# Patient Record
Sex: FEMALE | Race: OTHER | HISPANIC OR LATINO | URBAN - METROPOLITAN AREA
[De-identification: names, ages, dates, MRNs, and addresses within clinical notes are randomized per-mention and may not be internally consistent; named-entity substitution may affect disease eponyms.]

---

## 2021-02-24 ENCOUNTER — EMERGENCY (EMERGENCY)
Facility: HOSPITAL | Age: 36
LOS: 1 days | Discharge: HOME | End: 2021-02-24
Attending: EMERGENCY MEDICINE | Admitting: EMERGENCY MEDICINE
Payer: MEDICAID

## 2021-02-24 VITALS
HEART RATE: 140 BPM | SYSTOLIC BLOOD PRESSURE: 167 MMHG | TEMPERATURE: 98 F | OXYGEN SATURATION: 98 % | DIASTOLIC BLOOD PRESSURE: 77 MMHG | RESPIRATION RATE: 20 BRPM

## 2021-02-24 LAB
ALBUMIN SERPL ELPH-MCNC: 4.8 G/DL — SIGNIFICANT CHANGE UP (ref 3.3–5)
ALP SERPL-CCNC: 113 U/L — SIGNIFICANT CHANGE UP (ref 40–120)
ALT FLD-CCNC: 26 U/L — SIGNIFICANT CHANGE UP (ref 4–33)
ANION GAP SERPL CALC-SCNC: 15 MMOL/L — HIGH (ref 7–14)
APPEARANCE UR: CLEAR — SIGNIFICANT CHANGE UP
APTT BLD: 33.5 SEC — SIGNIFICANT CHANGE UP (ref 27–36.3)
AST SERPL-CCNC: 18 U/L — SIGNIFICANT CHANGE UP (ref 4–32)
BASOPHILS # BLD AUTO: 0.1 K/UL — SIGNIFICANT CHANGE UP (ref 0–0.2)
BASOPHILS NFR BLD AUTO: 0.7 % — SIGNIFICANT CHANGE UP (ref 0–2)
BILIRUB SERPL-MCNC: 0.5 MG/DL — SIGNIFICANT CHANGE UP (ref 0.2–1.2)
BILIRUB UR-MCNC: NEGATIVE — SIGNIFICANT CHANGE UP
BUN SERPL-MCNC: 11 MG/DL — SIGNIFICANT CHANGE UP (ref 7–23)
CALCIUM SERPL-MCNC: 9.6 MG/DL — SIGNIFICANT CHANGE UP (ref 8.4–10.5)
CHLORIDE SERPL-SCNC: 101 MMOL/L — SIGNIFICANT CHANGE UP (ref 98–107)
CO2 SERPL-SCNC: 22 MMOL/L — SIGNIFICANT CHANGE UP (ref 22–31)
COLOR SPEC: SIGNIFICANT CHANGE UP
CREAT SERPL-MCNC: 0.71 MG/DL — SIGNIFICANT CHANGE UP (ref 0.5–1.3)
DIFF PNL FLD: NEGATIVE — SIGNIFICANT CHANGE UP
EOSINOPHIL # BLD AUTO: 0.07 K/UL — SIGNIFICANT CHANGE UP (ref 0–0.5)
EOSINOPHIL NFR BLD AUTO: 0.5 % — SIGNIFICANT CHANGE UP (ref 0–6)
GLUCOSE SERPL-MCNC: 118 MG/DL — HIGH (ref 70–99)
GLUCOSE UR QL: NEGATIVE — SIGNIFICANT CHANGE UP
HCT VFR BLD CALC: 41.6 % — SIGNIFICANT CHANGE UP (ref 34.5–45)
HGB BLD-MCNC: 13 G/DL — SIGNIFICANT CHANGE UP (ref 11.5–15.5)
IANC: 7.6 K/UL — SIGNIFICANT CHANGE UP (ref 1.5–8.5)
IMM GRANULOCYTES NFR BLD AUTO: 0.7 % — SIGNIFICANT CHANGE UP (ref 0–1.5)
INR BLD: 1.13 RATIO — SIGNIFICANT CHANGE UP (ref 0.88–1.16)
KETONES UR-MCNC: NEGATIVE — SIGNIFICANT CHANGE UP
LEUKOCYTE ESTERASE UR-ACNC: NEGATIVE — SIGNIFICANT CHANGE UP
LYMPHOCYTES # BLD AUTO: 35.1 % — SIGNIFICANT CHANGE UP (ref 13–44)
LYMPHOCYTES # BLD AUTO: 4.69 K/UL — HIGH (ref 1–3.3)
MCHC RBC-ENTMCNC: 26.5 PG — LOW (ref 27–34)
MCHC RBC-ENTMCNC: 31.3 GM/DL — LOW (ref 32–36)
MCV RBC AUTO: 84.7 FL — SIGNIFICANT CHANGE UP (ref 80–100)
MONOCYTES # BLD AUTO: 0.8 K/UL — SIGNIFICANT CHANGE UP (ref 0–0.9)
MONOCYTES NFR BLD AUTO: 6 % — SIGNIFICANT CHANGE UP (ref 2–14)
NEUTROPHILS # BLD AUTO: 7.6 K/UL — HIGH (ref 1.8–7.4)
NEUTROPHILS NFR BLD AUTO: 57 % — SIGNIFICANT CHANGE UP (ref 43–77)
NITRITE UR-MCNC: NEGATIVE — SIGNIFICANT CHANGE UP
NRBC # BLD: 0 /100 WBCS — SIGNIFICANT CHANGE UP
NRBC # FLD: 0 K/UL — SIGNIFICANT CHANGE UP
PCP SPEC-MCNC: SIGNIFICANT CHANGE UP
PCP SPEC-MCNC: SIGNIFICANT CHANGE UP
PH UR: 6.5 — SIGNIFICANT CHANGE UP (ref 5–8)
PLATELET # BLD AUTO: 397 K/UL — SIGNIFICANT CHANGE UP (ref 150–400)
POTASSIUM SERPL-MCNC: 3.2 MMOL/L — LOW (ref 3.5–5.3)
POTASSIUM SERPL-SCNC: 3.2 MMOL/L — LOW (ref 3.5–5.3)
PROT SERPL-MCNC: 8.3 G/DL — SIGNIFICANT CHANGE UP (ref 6–8.3)
PROT UR-MCNC: ABNORMAL
PROTHROM AB SERPL-ACNC: 12.9 SEC — SIGNIFICANT CHANGE UP (ref 10.6–13.6)
RBC # BLD: 4.91 M/UL — SIGNIFICANT CHANGE UP (ref 3.8–5.2)
RBC # FLD: 13.4 % — SIGNIFICANT CHANGE UP (ref 10.3–14.5)
SARS-COV-2 RNA SPEC QL NAA+PROBE: SIGNIFICANT CHANGE UP
SODIUM SERPL-SCNC: 138 MMOL/L — SIGNIFICANT CHANGE UP (ref 135–145)
SP GR SPEC: 1.02 — SIGNIFICANT CHANGE UP (ref 1.01–1.02)
TROPONIN T, HIGH SENSITIVITY RESULT: <6 NG/L — SIGNIFICANT CHANGE UP
UROBILINOGEN FLD QL: SIGNIFICANT CHANGE UP
WBC # BLD: 13.36 K/UL — HIGH (ref 3.8–10.5)
WBC # FLD AUTO: 13.36 K/UL — HIGH (ref 3.8–10.5)

## 2021-02-24 PROCEDURE — 99284 EMERGENCY DEPT VISIT MOD MDM: CPT

## 2021-02-24 PROCEDURE — 93306 TTE W/DOPPLER COMPLETE: CPT | Mod: 26

## 2021-02-24 PROCEDURE — 99220: CPT

## 2021-02-24 PROCEDURE — 71275 CT ANGIOGRAPHY CHEST: CPT | Mod: 26

## 2021-02-24 RX ORDER — ATORVASTATIN CALCIUM 80 MG/1
20 TABLET, FILM COATED ORAL AT BEDTIME
Refills: 0 | Status: DISCONTINUED | OUTPATIENT
Start: 2021-02-24 | End: 2021-02-27

## 2021-02-24 RX ORDER — METOPROLOL TARTRATE 50 MG
12.5 TABLET ORAL
Refills: 0 | Status: DISCONTINUED | OUTPATIENT
Start: 2021-02-24 | End: 2021-02-27

## 2021-02-24 RX ORDER — SODIUM CHLORIDE 9 MG/ML
1000 INJECTION INTRAMUSCULAR; INTRAVENOUS; SUBCUTANEOUS ONCE
Refills: 0 | Status: COMPLETED | OUTPATIENT
Start: 2021-02-24 | End: 2021-02-24

## 2021-02-24 RX ORDER — LISINOPRIL 2.5 MG/1
5 TABLET ORAL DAILY
Refills: 0 | Status: DISCONTINUED | OUTPATIENT
Start: 2021-02-24 | End: 2021-02-27

## 2021-02-24 RX ORDER — LISINOPRIL 2.5 MG/1
5 TABLET ORAL ONCE
Refills: 0 | Status: COMPLETED | OUTPATIENT
Start: 2021-02-24 | End: 2021-02-24

## 2021-02-24 RX ORDER — SODIUM CHLORIDE 9 MG/ML
1000 INJECTION INTRAMUSCULAR; INTRAVENOUS; SUBCUTANEOUS
Refills: 0 | Status: DISCONTINUED | OUTPATIENT
Start: 2021-02-24 | End: 2021-02-27

## 2021-02-24 RX ORDER — POTASSIUM CHLORIDE 20 MEQ
20 PACKET (EA) ORAL ONCE
Refills: 0 | Status: COMPLETED | OUTPATIENT
Start: 2021-02-24 | End: 2021-02-24

## 2021-02-24 RX ORDER — ASPIRIN/CALCIUM CARB/MAGNESIUM 324 MG
162 TABLET ORAL ONCE
Refills: 0 | Status: COMPLETED | OUTPATIENT
Start: 2021-02-24 | End: 2021-02-24

## 2021-02-24 RX ADMIN — Medication 12.5 MILLIGRAM(S): at 19:40

## 2021-02-24 RX ADMIN — SODIUM CHLORIDE 1000 MILLILITER(S): 9 INJECTION INTRAMUSCULAR; INTRAVENOUS; SUBCUTANEOUS at 08:02

## 2021-02-24 RX ADMIN — SODIUM CHLORIDE 1000 MILLILITER(S): 9 INJECTION INTRAMUSCULAR; INTRAVENOUS; SUBCUTANEOUS at 09:05

## 2021-02-24 RX ADMIN — LISINOPRIL 5 MILLIGRAM(S): 2.5 TABLET ORAL at 13:24

## 2021-02-24 RX ADMIN — ATORVASTATIN CALCIUM 20 MILLIGRAM(S): 80 TABLET, FILM COATED ORAL at 22:27

## 2021-02-24 RX ADMIN — SODIUM CHLORIDE 150 MILLILITER(S): 9 INJECTION INTRAMUSCULAR; INTRAVENOUS; SUBCUTANEOUS at 16:40

## 2021-02-24 RX ADMIN — Medication 20 MILLIEQUIVALENT(S): at 15:14

## 2021-02-24 RX ADMIN — Medication 162 MILLIGRAM(S): at 08:02

## 2021-02-24 RX ADMIN — SODIUM CHLORIDE 1000 MILLILITER(S): 9 INJECTION INTRAMUSCULAR; INTRAVENOUS; SUBCUTANEOUS at 13:07

## 2021-02-24 NOTE — ED CDU PROVIDER INITIAL DAY NOTE - OBJECTIVE STATEMENT
34 y/o female with a PMH of HLD atorvastatin and recently diagnosed HTN (2/18) on lisinopril compliant with meds presents to the ED complaining of palpitations x 6 days. Patient woke up yesterday and felt her heart was racing so her partner took her pulse and it was 131. Patient was feeling SOB and OLEARY with a non-radiating intermittent burning/stabbing sensation on the left side of her chest. Patient states the palpitations have not been letting her sleep and she has been unable to go to work. The SOB and OLEARY has improved today (patient was able to ambulate to the room without difficulty), but the chest pain/burning has become constant. Patient states she woke up last Thursday feeling dizzy, took her BP noticed it was elevated and went to urgent care. Patient states she was started on lisinopril and d/c. Patient endorses some chills and diarrhea since last Thursday, along with SOB and OLEARY that has improved. Patient denies fever, sick contact, domestic/foreign travel, (COVID neg. 2/23), dizziness, leg swelling, headaches, nausea, vomiting, diaphoresis, back pain, or history of anxiety. Pt states that she has been eating and drinking without difficulties, excessive caffeine intake.  Denies having any recent drug use however does smoke marijuana occasionally.    CDU ALYCIA Henry: Agree with above except as noted. Patient is a 34 y/o F here w/ palpitations x 6 days. States she had a migraine last week, took Excedrin, and checked her BP which was elevated. Was seen at an urgent care and started on HCTZ. Noted palpitations and OLEARY after starting HCTZ. Went back to urgent care 2 days later and was switched to Lisinopril 5mg with improvement of BP, however palpitations have continued. Max HR 140S. Denies excess alcohol or caffeine use. Occasional marijuana use. Feels anxious, but does not believe anxiety is causing her sx. In CDU for tele monitoring, EP eval, and echocardiogram.

## 2021-02-24 NOTE — CONSULT NOTE ADULT - ATTENDING COMMENTS
35yoF w/ PMHx HLD presents with dizziness and palpitations.  In ED, EKG revealed sinus tachycardia 's with improvement with IVF.  States she is dizzy when changing positions from sitting to standing but after a couple minutes feels better.  Orthostatics done (lying) 140/81  (sitting) 140/76  and (standing) 152/106 .  Lab work significant for leukocytosis WBC 13.36.  Patient denies any fever or chills or history of exposure to COVID.      #Sinus tachycardia   Monitor on telemetry  TTE to evaluate LV/RV function  F/u TSH, and thyroid panel  F/u Tox screen   F/u Hcg level   F/u 24 hour urine metanephrine and catecholamine  Start metoprolol tartrate 12.5mg PO BID

## 2021-02-24 NOTE — ED CDU PROVIDER INITIAL DAY NOTE - NS ED ROS FT
ROS:  GENERAL: No fever, no chills  EYES: no change in vision  HEENT: no trouble swallowing, no trouble speaking  CARDIAC: +chest pain, +palpitations  PULMONARY: no cough, +OLEARY  GI: no abdominal pain, no nausea, no vomiting  : No dysuria, no frequency, no change in appearance, or odor of urine  SKIN: no rashes  NEURO: no headache, no weakness  MSK: No joint pain

## 2021-02-24 NOTE — ED PROVIDER NOTE - OBJECTIVE STATEMENT
36 y/o female with a PMH of HLD and recently diagnosed HTN (2/18) presents to the ED complaining of palpitations x 6 days. Patient woke up yesterday and felt her heart was racing so her partner took her pulse and it was 131. Patient was feeling SOB and OLEARY with a non-radiating intermittent burning/stabbing sensation on the left side of her chest. Patient states the palpitations have not been letting her sleep and she has been unable to go to work. The SOB and OLEARY has improved today (patient was able to ambulate to the room without difficulty), but the chest pain/burning has become constant. Patient states she woke up last Thursday feeling dizzy, took her BP noticed it was elevated and went to urgent care. Patient states she was started on lisinopril and d/c. Patient endorses some chills and diarrhea since last Thursday, along with SOB and OLEARY that has improved. Patient denies fever, sick contact, domestic/foreign travel, (COVID neg. 2/23), dizziness, headaches, nausea, vomiting, diaphoresis, back pain, or history of anxiety. 36 y/o female with a PMH of HLD atorvastatin and recently diagnosed HTN (2/18) on lisinopril compliant with meds presents to the ED complaining of palpitations x 6 days. Patient woke up yesterday and felt her heart was racing so her partner took her pulse and it was 131. Patient was feeling SOB and OLEARY with a non-radiating intermittent burning/stabbing sensation on the left side of her chest. Patient states the palpitations have not been letting her sleep and she has been unable to go to work. The SOB and OLEARY has improved today (patient was able to ambulate to the room without difficulty), but the chest pain/burning has become constant. Patient states she woke up last Thursday feeling dizzy, took her BP noticed it was elevated and went to urgent care. Patient states she was started on lisinopril and d/c. Patient endorses some chills and diarrhea since last Thursday, along with SOB and OLEARY that has improved. Patient denies fever, sick contact, domestic/foreign travel, (COVID neg. 2/23), dizziness, leg swelling, headaches, nausea, vomiting, diaphoresis, back pain, or history of anxiety. Pt states that she has been eating and drinking without difficulties, excessive caffeine intake.  Denies having any recent drug use however does smoke marijuana occasionally. 34 y/o female with a PMH of HLD atorvastatin and recently diagnosed HTN () on lisinopril compliant with meds presents to the ED complaining of palpitations x 6 days. Patient woke up yesterday and felt her heart was racing so her partner took her pulse and it was 131. Patient was feeling SOB and OLEARY with a non-radiating intermittent burning/stabbing sensation on the left side of her chest. Patient states the palpitations have not been letting her sleep and she has been unable to go to work. The SOB and OLEARY has improved today (patient was able to ambulate to the room without difficulty), but the chest pain/burning has become constant. Patient states she woke up last Thursday feeling dizzy, took her BP noticed it was elevated and went to urgent care. Patient states she was started on lisinopril and d/c. Patient endorses some chills and diarrhea since last Thursday, along with SOB and OLEARY that has improved. Patient denies fever, sick contact, domestic/foreign travel, (COVID neg. ), dizziness, leg swelling, headaches, nausea, vomiting, diaphoresis, back pain, or history of anxiety. Pt states that she has been eating and drinking without difficulties, excessive caffeine intake.  Denies having any recent drug use however does smoke marijuana occasionally.    Attendinyo female presents palpitations and shortness of breath.  had chest pain earlier intermittently.  no exertional symptoms.   does not feel short of breath now.

## 2021-02-24 NOTE — ED ADULT TRIAGE NOTE - CHIEF COMPLAINT QUOTE
pt arrives c/o palpitations since last night, 's in triage, was at [facility] care on Thursday for elevated BP, EKG in progress

## 2021-02-24 NOTE — ED ADULT NURSE REASSESSMENT NOTE - NS ED NURSE REASSESS COMMENT FT1
report received from night RN. pt a&ox4, ambulatory. respirations even and unlabored on room air. sinus tachycardia noted on CCM with rate of 115. pt reports improvement in palpitations. Left 20G IV noted with bolus infusing, no complications noted. pt denies pain, chest pain, SOB, N/V/D. Urine and blood work collected and sent. EKG in chart. COVID swab completed.

## 2021-02-24 NOTE — ED CDU PROVIDER INITIAL DAY NOTE - ATTENDING CONTRIBUTION TO CARE
Dr. Weir:  I performed a face to face bedside interview with patient regarding history of present illness, review of symptoms and past medical history. I completed an independent physical exam.  I have discussed patient's plan of care with PA.   I agree with note as stated above, having amended the EMR as needed to reflect my findings.   This includes HISTORY OF PRESENT ILLNESS, HIV, PAST MEDICAL/SURGICAL/FAMILY/SOCIAL HISTORY, ALLERGIES AND HOME MEDICATIONS, REVIEW OF SYSTEMS, PHYSICAL EXAM, and any PROGRESS NOTES during the time I functioned as the attending physician for this patient.    34yo female presents palpitations and shortness of breath.  +intermittent CP.  Tachycardic on monitor    Exam:  - nad  - tachy, regular   -ctab   -abd soft ntnd    A/P  - palpitations/tachycardia  - appreciate EP recs  - pending echo

## 2021-02-24 NOTE — ED PROVIDER NOTE - CLINICAL SUMMARY MEDICAL DECISION MAKING FREE TEXT BOX
36 y/o female with a PMH of HLD atorvastatin and recently diagnosed HTN (2/18) on lisinopril compliant with meds presents to the ED complaining of palpitations x 6 days. Palpitations- possible PE, infection, arrythmia will do labs, ekg, trop ct chest, echo will continue to monitor.

## 2021-02-24 NOTE — ED ADULT NURSE NOTE - OBJECTIVE STATEMENT
A&Ox4, amb c/c SOB, L sided chest pain, palpitations, dizziness since Thursday - no surgical hx, NDKA, no recent sickness, HX hyperlipidemia, occasional marijuana use. IV placed L arm 20G - placed on cardiac monitoring. Attending at bedside. Pt is a 35yr old female, A&Ox4 and ambulatory, PMH of HDL,  c/o of midsternal burning chest pain, palpitations, dizziness since Thursday. Pt went to urgent care a day ago and had high blood pressure and was started on Lisinopril. Resp. even and unlabored. Denies SOB, HA, dizziness, abd. pain, N/V, fever/chills, cough, and urinary symptoms. IV placed L arm 20G. Labs sent. Sinus tachycardia on the CM. VS as noted. MD Roberson at bedside. NAD.

## 2021-02-24 NOTE — CONSULT NOTE ADULT - ASSESSMENT
35yoF w/ PMHx HLD presents with dizziness and palpitations.  In ED, EKG revealed sinus tachycardia 's with improvement with IVF.  States she is dizzy when changing positions from sitting to standing but after a couple minutes feels better.  Orthostatics done (laying) 140/81  (sitting) 140/76  and (standing) 152/106 .  Lab work significant for leukocytosis WBC 13.36.  Patient denies any fever or chills or history of exposure to COVID.      #Sinus tachycardia   Monitor on telemetry  TTE to evaluate LV/RV function  F/u TSH, and thyroid panel  F/u Tox screen   F/u Hcg level  35yoF w/ PMHx HLD presents with dizziness and palpitations.  In ED, EKG revealed sinus tachycardia 's with improvement with IVF.  States she is dizzy when changing positions from sitting to standing but after a couple minutes feels better.  Orthostatics done (laying) 140/81  (sitting) 140/76  and (standing) 152/106 .  Lab work significant for leukocytosis WBC 13.36.  Patient denies any fever or chills or history of exposure to COVID.      #Sinus tachycardia   Monitor on telemetry  TTE to evaluate LV/RV function  F/u TSH, and thyroid panel  F/u Tox screen   F/u Hcg level   F/u 24 hour urine metanephrine and catecholeamine 35yoF w/ PMHx HLD presents with dizziness and palpitations.  In ED, EKG revealed sinus tachycardia 's with improvement with IVF.  States she is dizzy when changing positions from sitting to standing but after a couple minutes feels better.  Orthostatics done (laying) 140/81  (sitting) 140/76  and (standing) 152/106 .  Lab work significant for leukocytosis WBC 13.36.  Patient denies any fever or chills or history of exposure to COVID.      #Sinus tachycardia   Monitor on telemetry  TTE to evaluate LV/RV function  F/u TSH, and thyroid panel  F/u Tox screen   F/u Hcg level   F/u 24 hour urine metanephrine and catecholeamine  Start metoprolol tartrate 12.5mg PO BID

## 2021-02-24 NOTE — ED CDU PROVIDER INITIAL DAY NOTE - PHYSICAL EXAMINATION
Vital signs reviewed.   CONSTITUTIONAL: Well-appearing; well-nourished; in no apparent distress. Non-toxic appearing.   HEAD: Normocephalic, atraumatic.  EYES: PERRL, EOM intact, conjunctiva and sclera WNL.  ENT: normal nose; no rhinorrhea; normal pharynx with no tonsillar hypertrophy, no erythema, no exudate, no lymphadenopathy.  NECK/LYMPH: Supple; non-tender; no cervical lymphadenopathy.  CARD: Tachycardic, S1, S2; no LE edema or calf tenderness vicente   RESP: Normal chest excursion with respiration; breath sounds clear and equal bilaterally; no wheezes, rhonchi, or rales.  EXT/MS: moves all extremities; distal pulses are normal, no pedal edema.  SKIN: Normal for age and race; warm; dry; good turgor; no apparent lesions or exudate noted.  NEURO: Awake, alert, oriented x 3, no gross deficits, CN II-XII grossly intact, no motor or sensory deficit noted.  PSYCH: Normal mood; appropriate affect.

## 2021-02-24 NOTE — CONSULT NOTE ADULT - SUBJECTIVE AND OBJECTIVE BOX
Date of Admission: 2/24/2021    CHIEF COMPLAINT: Palpitations    HISTORY OF PRESENT ILLNESS:  This is a 35yoF w/ PMHx HLD presents with dizziness and palpitations.  Symptoms started about 2 days ago for which she went to an urgent care center and was found to have high blood pressure (SBP>170) and tachycardic (HR>130) and was started on HCTZ which made her too weak to ambulate.  Denies any changes in lifestyle, diet or medications other than atorvastatin, HCTZ and lisinopril that was she was started on.  Had a dentist appointment 2 weeks ago at which time her vitals were WNL.      In ED, EKG revealed sinus tachycardia 's with improvement with IVF.  States she is dizzy when changing positions from sitting to standing but after a couple minutes feels better.  Orthostatics done (laying) 140/81  (sitting) 140/76  and (standing) 152/106 .  Lab work significant for leukocytosis WBC 13.36.  Patient denies any fever or chills or history of exposure to COVID.      Allergies  No Known Allergies    MEDICATIONS:  lisinopril 5 milliGRAM(s) Oral once    REVIEW OF SYSTEMS:  See HPI. Otherwise, 10 point ROS done and otherwise negative.    PHYSICAL EXAM:  T(C): 36.9 (02-24-21 @ 12:56), Max: 36.9 (02-24-21 @ 12:56)  HR: 117 (02-24-21 @ 12:56) (117 - 140)  BP: 140/81 (02-24-21 @ 12:56) (140/81 - 167/77)  RR: 18 (02-24-21 @ 12:56) (18 - 20)  SpO2: 100% (02-24-21 @ 12:56) (98% - 100%)  Wt(kg): --  I&O's Summary    Appearance: Normal	  HEENT:   Normal oral mucosa, PERRL, EOMI	  Lymphatic: No lymphadenopathy  Cardiovascular: Normal S1 S2, No JVD, No murmurs, No edema  Respiratory: Lungs clear to auscultation	  Psychiatry: A & O x 3, Mood & affect appropriate  Gastrointestinal:  Soft, Non-tender, + BS	  Skin: No rashes, No ecchymoses, No cyanosis	  Neurologic: Non-focal  Extremities: Normal range of motion, No clubbing, cyanosis or edema  Vascular: Peripheral pulses palpable 2+ bilaterally    LABS:	 	  CBC Full  -  ( 24 Feb 2021 08:50 )  WBC Count : 13.36 K/uL  Hemoglobin : 13.0 g/dL  Hematocrit : 41.6 %  Platelet Count - Automated : 397 K/uL  Mean Cell Volume : 84.7 fL  Mean Cell Hemoglobin : 26.5 pg  Mean Cell Hemoglobin Concentration : 31.3 gm/dL  Auto Neutrophil # : 7.60 K/uL  Auto Lymphocyte # : 4.69 K/uL  Auto Monocyte # : 0.80 K/uL  Auto Eosinophil # : 0.07 K/uL  Auto Basophil # : 0.10 K/uL  Auto Neutrophil % : 57.0 %  Auto Lymphocyte % : 35.1 %  Auto Monocyte % : 6.0 %  Auto Eosinophil % : 0.5 %  Auto Basophil % : 0.7 %    02-24    138  |  101  |  11  ----------------------------<  118<H>  3.2<L>   |  22  |  0.71    Ca    9.6      24 Feb 2021 08:50    TPro  8.3  /  Alb  4.8  /  TBili  0.5  /  DBili  x   /  AST  18  /  ALT  26  /  AlkPhos  113  02-24      < from: CT Angio Chest w/ IV Cont (02.24.21 @ 12:19) >  IMPRESSION:    No pulmonary embolus.      < end of copied text >

## 2021-02-24 NOTE — ED CDU PROVIDER INITIAL DAY NOTE - PROGRESS NOTE DETAILS
Echo negative. Spoke w/ EP, do not recommend starting any medications now. Request we contact them if patient becomes symptomatic or tachycardia ~140s is sustained. EP ok with HR being low 100s-119 at rest. Pt asymptomatic now. EP called, recommend Metoprolol 12.5mg BID.

## 2021-02-24 NOTE — ED ADULT NURSE NOTE - CHIEF COMPLAINT QUOTE
pt arrives c/o palpitations since last night, 's in triage, was at [facility] care on Thursday for elevated BP, EKG in progress

## 2021-02-25 VITALS
RESPIRATION RATE: 16 BRPM | SYSTOLIC BLOOD PRESSURE: 113 MMHG | HEART RATE: 97 BPM | OXYGEN SATURATION: 100 % | TEMPERATURE: 99 F | DIASTOLIC BLOOD PRESSURE: 67 MMHG

## 2021-02-25 PROBLEM — E78.5 HYPERLIPIDEMIA, UNSPECIFIED: Chronic | Status: ACTIVE | Noted: 2021-02-24

## 2021-02-25 PROBLEM — I10 ESSENTIAL (PRIMARY) HYPERTENSION: Chronic | Status: ACTIVE | Noted: 2021-02-24

## 2021-02-25 LAB
A1C WITH ESTIMATED AVERAGE GLUCOSE RESULT: 5.3 % — SIGNIFICANT CHANGE UP (ref 4–5.6)
ANION GAP SERPL CALC-SCNC: 6 MMOL/L — LOW (ref 7–14)
BASOPHILS # BLD AUTO: 0.06 K/UL — SIGNIFICANT CHANGE UP (ref 0–0.2)
BASOPHILS NFR BLD AUTO: 0.6 % — SIGNIFICANT CHANGE UP (ref 0–2)
BUN SERPL-MCNC: 7 MG/DL — SIGNIFICANT CHANGE UP (ref 7–23)
CALCIUM SERPL-MCNC: 8.4 MG/DL — SIGNIFICANT CHANGE UP (ref 8.4–10.5)
CHLORIDE SERPL-SCNC: 108 MMOL/L — HIGH (ref 98–107)
CO2 SERPL-SCNC: 21 MMOL/L — LOW (ref 22–31)
CREAT SERPL-MCNC: 0.59 MG/DL — SIGNIFICANT CHANGE UP (ref 0.5–1.3)
EOSINOPHIL # BLD AUTO: 0.08 K/UL — SIGNIFICANT CHANGE UP (ref 0–0.5)
EOSINOPHIL NFR BLD AUTO: 0.8 % — SIGNIFICANT CHANGE UP (ref 0–6)
ESTIMATED AVERAGE GLUCOSE: 105 MG/DL — SIGNIFICANT CHANGE UP (ref 68–114)
GLUCOSE SERPL-MCNC: 90 MG/DL — SIGNIFICANT CHANGE UP (ref 70–99)
HCT VFR BLD CALC: 34.5 % — SIGNIFICANT CHANGE UP (ref 34.5–45)
HGB BLD-MCNC: 11 G/DL — LOW (ref 11.5–15.5)
IANC: 6.5 K/UL — SIGNIFICANT CHANGE UP (ref 1.5–8.5)
IMM GRANULOCYTES NFR BLD AUTO: 0.8 % — SIGNIFICANT CHANGE UP (ref 0–1.5)
LYMPHOCYTES # BLD AUTO: 3.31 K/UL — HIGH (ref 1–3.3)
LYMPHOCYTES # BLD AUTO: 31.3 % — SIGNIFICANT CHANGE UP (ref 13–44)
MCHC RBC-ENTMCNC: 27.4 PG — SIGNIFICANT CHANGE UP (ref 27–34)
MCHC RBC-ENTMCNC: 31.9 GM/DL — LOW (ref 32–36)
MCV RBC AUTO: 85.8 FL — SIGNIFICANT CHANGE UP (ref 80–100)
MONOCYTES # BLD AUTO: 0.55 K/UL — SIGNIFICANT CHANGE UP (ref 0–0.9)
MONOCYTES NFR BLD AUTO: 5.2 % — SIGNIFICANT CHANGE UP (ref 2–14)
NEUTROPHILS # BLD AUTO: 6.5 K/UL — SIGNIFICANT CHANGE UP (ref 1.8–7.4)
NEUTROPHILS NFR BLD AUTO: 61.3 % — SIGNIFICANT CHANGE UP (ref 43–77)
NRBC # BLD: 0 /100 WBCS — SIGNIFICANT CHANGE UP
NRBC # FLD: 0 K/UL — SIGNIFICANT CHANGE UP
PLATELET # BLD AUTO: 316 K/UL — SIGNIFICANT CHANGE UP (ref 150–400)
POTASSIUM SERPL-MCNC: 4 MMOL/L — SIGNIFICANT CHANGE UP (ref 3.5–5.3)
POTASSIUM SERPL-SCNC: 4 MMOL/L — SIGNIFICANT CHANGE UP (ref 3.5–5.3)
RBC # BLD: 4.02 M/UL — SIGNIFICANT CHANGE UP (ref 3.8–5.2)
RBC # FLD: 13.8 % — SIGNIFICANT CHANGE UP (ref 10.3–14.5)
SODIUM SERPL-SCNC: 135 MMOL/L — SIGNIFICANT CHANGE UP (ref 135–145)
WBC # BLD: 10.58 K/UL — HIGH (ref 3.8–10.5)
WBC # FLD AUTO: 10.58 K/UL — HIGH (ref 3.8–10.5)

## 2021-02-25 PROCEDURE — 99217: CPT

## 2021-02-25 PROCEDURE — 99214 OFFICE O/P EST MOD 30 MIN: CPT

## 2021-02-25 RX ORDER — ASPIRIN/CALCIUM CARB/MAGNESIUM 324 MG
81 TABLET ORAL DAILY
Refills: 0 | Status: DISCONTINUED | OUTPATIENT
Start: 2021-02-25 | End: 2021-02-27

## 2021-02-25 RX ORDER — METOPROLOL TARTRATE 50 MG
1 TABLET ORAL
Qty: 30 | Refills: 1
Start: 2021-02-25

## 2021-02-25 RX ORDER — LISINOPRIL 2.5 MG/1
0 TABLET ORAL
Qty: 0 | Refills: 0 | DISCHARGE

## 2021-02-25 RX ADMIN — SODIUM CHLORIDE 150 MILLILITER(S): 9 INJECTION INTRAMUSCULAR; INTRAVENOUS; SUBCUTANEOUS at 06:47

## 2021-02-25 RX ADMIN — Medication 12.5 MILLIGRAM(S): at 06:49

## 2021-02-25 RX ADMIN — LISINOPRIL 5 MILLIGRAM(S): 2.5 TABLET ORAL at 06:50

## 2021-02-25 NOTE — ED CDU PROVIDER SUBSEQUENT DAY NOTE - ATTENDING CONTRIBUTION TO CARE
Pt was seen and evaluated by me. Pt is a 34 y/o female with a PMH of HLD and HTN who presented to the ED for palpitations X 6 days prior to presentation. Pt states she woke the day prior to presentation with palpitations and noted to have a HR of 131. Pt states having SOB at that time. Pt was noted to have elevated BP with some dizziness and recently and started on Lisinopril. Pt admits to some burning on her left side of chest. Pt admit to chills with diarrhea. Pt denies any nausea, vomiting, or abd pain. Pt was sent to [facility] for tele monitoring and echo.   Concern for arrhythmia/ACS  Tele monitoring, echo Pt was seen and evaluated by me. Pt is a 36 y/o female with a PMH of HLD and HTN who presented to the ED for palpitations X 6 days prior to presentation. Pt states she woke the day prior to presentation with palpitations and noted to have a HR of 131. Pt states having SOB at that time. Pt was noted to have elevated BP with some dizziness and recently and started on Lisinopril. Pt admits to some burning on her left side of chest. Pt admit to chills with diarrhea. Pt denies any nausea, vomiting, or abd pain. Lungs CTA b/l. RRR. Abs soft, non-tender. No calf tenderness or welling. Pt was sent to [facility] for tele monitoring and echo.   Concern for arrhythmia/ACS  Tele monitoring, echo

## 2021-02-25 NOTE — ED CDU PROVIDER DISPOSITION NOTE - PATIENT PORTAL LINK FT
You can access the FollowMyHealth Patient Portal offered by [facility] by registering at the following website: [email or web address]. By joining [facility]’s FollowMyHealth portal, you will also be able to view your health information using other applications (apps) compatible with our system.

## 2021-02-25 NOTE — ED CDU PROVIDER SUBSEQUENT DAY NOTE - NEURO NEGATIVE STATEMENT, MLM
74 no loss of consciousness, no gait abnormality, no headache, no sensory deficits, and no weakness.

## 2021-02-25 NOTE — ED CDU PROVIDER DISPOSITION NOTE - NSFOLLOWUPINSTRUCTIONS_ED_ALL_ED_FT
Heart Palpitations    WHAT YOU NEED TO KNOW:    Heart palpitations are feelings that your heart races, jumps, throbs, or flutters. You may feel extra beats, no beats for a short time, or skipped beats. You may have these feelings in your chest, throat, or neck. They may happen when you are sitting, standing, or lying. Heart palpitations may be frightening, but are usually not caused by a serious problem.     DISCHARGE INSTRUCTIONS:    Call 911 or have someone else call for any of the following:     You have any of the following signs of a heart attack:   Squeezing, pressure, or pain in your chest      You may also have any of the following:   Discomfort or pain in your back, neck, jaw, stomach, or arm  Shortness of breath  Nausea or vomiting  Lightheadedness or a sudden cold sweat      You have any of the following signs of a stroke:   Numbness or drooping on one side of your face   Weakness in an arm or leg  Confusion or difficulty speaking  Dizziness, a severe headache, or vision loss  You faint or lose consciousness.     Return to the emergency department if:   Your palpitations happen more often or get more intense.       Contact your healthcare provider if:   You have new or worsening swelling in your feet or ankles.  You have questions or concerns about your condition or care.    Follow up with your healthcare provider as directed: You may need to follow up with a cardiologist. You may need tests to check for heart problems that cause palpitations. Write down your questions so you remember to ask them during your visits.     Keep a record: Write down when your palpitations start and stop, what you were doing when they started, and your symptoms. Keep track of what you ate or drank within a few hours of your palpitations. Include anything that seemed to help your symptoms, such as lying down or holding your breath. This record will help you and your healthcare provider learn what triggers your palpitations. Bring this record with you to your follow up visits.    Help prevent heart palpitations:   Manage stress and anxiety. Find ways to relax such as listening to music, meditating, or doing yoga. Exercise can also help decrease stress and anxiety. Talk to someone you trust about your stress or anxiety. You can also talk to a therapist.   Get plenty of sleep every night. Ask your healthcare provider how much sleep you need each night.   Do not drink caffeine or alcohol. Caffeine and alcohol can make your palpitations worse. Caffeine is found in soda, coffee, tea, chocolate, and drinks that increase your energy.   Do not smoke. Nicotine and other chemicals in cigarettes and cigars may damage your heart and blood vessels. Ask your healthcare provider for information if you currently smoke and need help to quit. E-cigarettes or smokeless tobacco still contain nicotine. Talk to your healthcare provider before you use these products.   Do not use illegal drugs. Talk to your healthcare provider if you use illegal drugs and want help to quit.

## 2021-02-25 NOTE — ED CDU PROVIDER DISPOSITION NOTE - CARE PROVIDER_API CALL
Reji Rojo (MD)  Cardiac Electrophysiology; Cardiovascular Disease; Internal Medicine  [phone] [place] [address], [id number]  [place] [address]  Phone: [phone]  Fax: [phone]  Scheduled Appointment: 03/12/2021 11:30 AM

## 2021-02-25 NOTE — PROGRESS NOTE ADULT - SUBJECTIVE AND OBJECTIVE BOX
Interval History:  Telemetry: NSR 's overnight   Patient states she has not had any palpitations or dizziness even when standing up and ambulating     MEDICATIONS  (STANDING):  aspirin enteric coated 81 milliGRAM(s) Oral daily  atorvastatin 20 milliGRAM(s) Oral at bedtime  lisinopril 5 milliGRAM(s) Oral daily  metoprolol tartrate 12.5 milliGRAM(s) Oral two times a day  sodium chloride 0.9%. 1000 milliLiter(s) (150 mL/Hr) IV Continuous <Continuous>    MEDICATIONS  (PRN):    Vital Signs Last 24 Hrs  T(C): 37.2 (25 Feb 2021 10:15), Max: 37.2 (25 Feb 2021 10:15)  T(F): 98.9 (25 Feb 2021 10:15), Max: 98.9 (25 Feb 2021 10:15)  HR: 97 (25 Feb 2021 10:15) (83 - 117)  BP: 113/67 (25 Feb 2021 10:15) (108/66 - 140/81)  BP(mean): --  RR: 16 (25 Feb 2021 10:15) (14 - 18)  SpO2: 100% (25 Feb 2021 10:15) (98% - 100%)    Appearance: Normal	  HEENT:   Normal oral mucosa, PERRL, EOMI	  Lymphatic: No lymphadenopathy  Cardiovascular: Normal S1 S2, No JVD, No murmurs, No edema  Respiratory: Lungs clear to auscultation	  Psychiatry: A & O x 3, Mood & affect appropriate  Gastrointestinal:  Soft, Non-tender, + BS	  Skin: No rashes, No ecchymoses, No cyanosis	  Neurologic: Non-focal  Extremities: Normal range of motion, No clubbing, cyanosis or edema  Vascular: Peripheral pulses palpable 2+ bilaterally    LABS:	 	    CBC Full  -  ( 25 Feb 2021 08:08 )  WBC Count : 10.58 K/uL  Hemoglobin : 11.0 g/dL  Hematocrit : 34.5 %  Platelet Count - Automated : 316 K/uL  Mean Cell Volume : 85.8 fL  Mean Cell Hemoglobin : 27.4 pg  Mean Cell Hemoglobin Concentration : 31.9 gm/dL  Auto Neutrophil # : 6.50 K/uL  Auto Lymphocyte # : 3.31 K/uL  Auto Monocyte # : 0.55 K/uL  Auto Eosinophil # : 0.08 K/uL  Auto Basophil # : 0.06 K/uL  Auto Neutrophil % : 61.3 %  Auto Lymphocyte % : 31.3 %  Auto Monocyte % : 5.2 %  Auto Eosinophil % : 0.8 %  Auto Basophil % : 0.6 %    02-25    135  |  108<H>  |  7   ----------------------------<  90  4.0   |  21<L>  |  0.59  02-24    138  |  101  |  11  ----------------------------<  118<H>  3.2<L>   |  22  |  0.71    Ca    8.4      25 Feb 2021 08:08  Ca    9.6      24 Feb 2021 08:50    TPro  8.3  /  Alb  4.8  /  TBili  0.5  /  DBili  x   /  AST  18  /  ALT  26  /  AlkPhos  113  02-24    PT/INR - ( 24 Feb 2021 08:50 )   PT: 12.9 sec;   INR: 1.13 ratio       PTT - ( 24 Feb 2021 08:50 )  PTT:33.5 sec    LIVER FUNCTIONS - ( 24 Feb 2021 08:50 )  Alb: 4.8 g/dL / Pro: 8.3 g/dL / ALK PHOS: 113 U/L / ALT: 26 U/L / AST: 18 U/L / GGT: x

## 2021-02-25 NOTE — ED CDU PROVIDER DISPOSITION NOTE - ATTENDING CONTRIBUTION TO CARE
Pt was seen and evaluated by me. Pt is a 36 y/o female with a PMH of HLD and HTN who presented to the ED for palpitations X 6 days prior to presentation. Pt states she woke the day prior to presentation with palpitations and noted to have a HR of 131. Pt states having SOB at that time. Pt was noted to have elevated BP with some dizziness and recently and started on Lisinopril. Pt admits to some burning on her left side of chest. Pt admit to chills with diarrhea. Pt denies any nausea, vomiting, or abd pain. Lungs CTA b/l. RRR. Abs soft, non-tender. No calf tenderness or welling. Pt was sent to [facility]BS for tele monitoring and echo.   Concern for arrhythmia/ACS  Tele monitoring, echo

## 2021-02-25 NOTE — PROGRESS NOTE ADULT - ASSESSMENT
35yoF w/ PMHx HLD presents with dizziness and palpitations.  In ED, EKG revealed sinus tachycardia 's with improvement with IVF.  States she is dizzy when changing positions from sitting to standing but after a couple minutes feels better.  Orthostatics done (laying) 140/81  (sitting) 140/76  and (standing) 152/106 .  Lab work significant for leukocytosis WBC 13.36.  Patient denies any fever or chills or history of exposure to COVID.      #Sinus tachycardia   Monitor on telemetry  TTE - EF 70% normal LV/RV function   TSH - WNL  Tox Screen - positive for THC   Hcg - negative urine   F/u 24 hour urine metanephrine and catecholeamine  Change metoprolol tartrate 12.5mg PO BID to Toprol 25mg PO daily   Will follow up with Dr Rojo outpatient on 3/12 at 1130AM after discharge 35yoF w/ PMHx HLD presents with dizziness and palpitations.  In ED, EKG revealed sinus tachycardia 's with improvement with IVF.  States she is dizzy when changing positions from sitting to standing but after a couple minutes feels better.  Orthostatics done (lying) 140/81  (sitting) 140/76  and (standing) 152/106 .  Lab work significant for leukocytosis WBC 13.36.  Patient denies any fever or chills or history of exposure to COVID.      #Sinus tachycardia   Monitor on telemetry  TTE - EF 70% normal LV/RV function   TSH - WNL  Tox Screen - positive for THC   Hcg - negative urine   F/u 24 hour urine metanephrine and catecholeamine  Change metoprolol tartrate 12.5mg PO BID to Toprol 25mg PO daily   Will follow up with Dr Rojo outpatient on 3/12 at 1130AM after discharge

## 2021-02-25 NOTE — ED CDU PROVIDER SUBSEQUENT DAY NOTE - MEDICAL DECISION MAKING DETAILS
Tele monitoring, general observation care / monitoring; EP team is following pt.  AM labs are ordered.

## 2021-02-25 NOTE — PROGRESS NOTE ADULT - ATTENDING COMMENTS
35yoF w/ PMHx HLD presents with dizziness and palpitations.  In ED, EKG revealed sinus tachycardia 's with improvement with IVF.  States she is dizzy when changing positions from sitting to standing but after a couple minutes feels better.  Orthostatics done (lying) 140/81  (sitting) 140/76  and (standing) 152/106 .  Lab work significant for leukocytosis WBC 13.36.  Patient denies any fever or chills or history of exposure to COVID.      #Sinus tachycardia   Monitor on telemetry  TTE - EF 70% normal LV/RV function   TSH - WNL  Tox Screen - positive for THC   Hcg - negative urine   F/u 24 hour urine metanephrine and catecholamine  Change metoprolol tartrate 12.5mg PO BID to Toprol 25mg PO daily

## 2021-02-25 NOTE — ED CDU PROVIDER DISPOSITION NOTE - CLINICAL COURSE
34 y/o F pmh htn, hld presented with palpitations. Trop <6, CTA negative for PE; pt was dispo'd to CDU for tele monitoring, EP eval, echocardiogram-showed no abnormalities. Seen by EP, recommending metoprolol 25mg xr poqd. Will f/u with Dr. Rojo outpatient.

## 2021-02-25 NOTE — ED CDU PROVIDER SUBSEQUENT DAY NOTE - HISTORY
36 yo female, PMH HTN and hyperlipidemia, presented to the ED c/o palpitations x 6 days and notes elevated BP.  Pt. had been evaluated at urgent care and started on HCTZ; noted palpitations and OLEARY after starting HCTZ. Went back to urgent care 2 days later and was switched to Lisinopril 5mg with improvement of BP, however palpitations have continued. Max HR 140S. Denies excess alcohol or caffeine use. Occasional marijuana use. Feels anxious, but does not believe anxiety is causing her sx.  Pt was evaluated in the ED, trop <6, CTA negative for PE; pt was dispo'd to CDU for tele monitoring, EP eval, echocardiogram, and general observation care / monitoring.  On 2/24, echo was performed and was unremarkable.  EP evaluated pt, advised starting metoprolol, and advised continued tele monitoring.  No other issues in the interim; HR on monitor currently NSR in 80s.

## 2021-03-05 NOTE — ED POST DISCHARGE NOTE - RESULT SUMMARY
ALYCIA Henry: Pt called back requesting copies of her results. States she will come by later today to  the results from her visit on 2/27/21.

## 2021-03-12 ENCOUNTER — APPOINTMENT (OUTPATIENT)
Dept: ELECTROPHYSIOLOGY | Facility: CLINIC | Age: 36
End: 2021-03-12

## 2021-03-12 PROBLEM — Z00.00 ENCOUNTER FOR PREVENTIVE HEALTH EXAMINATION: Status: ACTIVE | Noted: 2021-03-12

## 2021-04-06 ENCOUNTER — TRANSCRIPTION ENCOUNTER (OUTPATIENT)
Age: 36
End: 2021-04-06

## 2024-02-09 NOTE — ED CDU PROVIDER INITIAL DAY NOTE - ASSESSMENT PLAN
Called and spoke with patient, conveyed results. Patient thankful for the call, no concerns at this time.     Telemetry/Echocardiogram

## 2024-09-12 ENCOUNTER — EMERGENCY (EMERGENCY)
Age: 39
LOS: 1 days | Discharge: HOME | End: 2024-09-12
Admitting: EMERGENCY MEDICINE
Payer: MEDICAID

## 2024-09-12 VITALS
HEART RATE: 82 BPM | HEIGHT: 61 IN | OXYGEN SATURATION: 99 % | RESPIRATION RATE: 18 BRPM | WEIGHT: 199.96 LBS | SYSTOLIC BLOOD PRESSURE: 141 MMHG | TEMPERATURE: 98 F | DIASTOLIC BLOOD PRESSURE: 88 MMHG

## 2024-09-12 DIAGNOSIS — M54.50 LOW BACK PAIN, UNSPECIFIED: ICD-10-CM

## 2024-09-12 LAB
APPEARANCE UR: CLEAR — SIGNIFICANT CHANGE UP
BILIRUB UR-MCNC: NEGATIVE — SIGNIFICANT CHANGE UP
COLOR SPEC: YELLOW — SIGNIFICANT CHANGE UP
DIFF PNL FLD: NEGATIVE — SIGNIFICANT CHANGE UP
GLUCOSE UR QL: NEGATIVE MG/DL — SIGNIFICANT CHANGE UP
KETONES UR-MCNC: ABNORMAL MG/DL
LEUKOCYTE ESTERASE UR-ACNC: NEGATIVE — SIGNIFICANT CHANGE UP
NITRITE UR-MCNC: NEGATIVE — SIGNIFICANT CHANGE UP
PH UR: 6.5 — SIGNIFICANT CHANGE UP (ref 5–8)
PROT UR-MCNC: NEGATIVE MG/DL — SIGNIFICANT CHANGE UP
SP GR SPEC: 1.02 — SIGNIFICANT CHANGE UP (ref 1–1.03)
UROBILINOGEN FLD QL: 0.2 MG/DL — SIGNIFICANT CHANGE UP (ref 0.2–1)

## 2024-09-12 PROCEDURE — 99284 EMERGENCY DEPT VISIT MOD MDM: CPT

## 2024-09-12 RX ORDER — METHOCARBAMOL 500 MG/1
2 TABLET, FILM COATED ORAL
Qty: 30 | Refills: 0
Start: 2024-09-12 | End: 2024-09-16

## 2024-09-12 RX ORDER — IBUPROFEN 200 MG
600 TABLET ORAL ONCE
Refills: 0 | Status: COMPLETED | OUTPATIENT
Start: 2024-09-12 | End: 2024-09-12

## 2024-09-12 RX ORDER — ACETAMINOPHEN 500 MG/5ML
650 LIQUID (ML) ORAL ONCE
Refills: 0 | Status: COMPLETED | OUTPATIENT
Start: 2024-09-12 | End: 2024-09-12

## 2024-09-12 RX ORDER — LIDOCAINE HYDROCHLORIDE 20 MG/ML
1 JELLY TOPICAL ONCE
Refills: 0 | Status: COMPLETED | OUTPATIENT
Start: 2024-09-12 | End: 2024-09-12

## 2024-09-13 LAB
CULTURE RESULTS: SIGNIFICANT CHANGE UP
SPECIMEN SOURCE: SIGNIFICANT CHANGE UP

## 2025-03-10 ENCOUNTER — EMERGENCY (EMERGENCY)
Facility: HOSPITAL | Age: 40
LOS: 1 days | Discharge: HOME | End: 2025-03-10
Admitting: STUDENT IN AN ORGANIZED HEALTH CARE EDUCATION/TRAINING PROGRAM
Payer: MEDICAID

## 2025-03-10 VITALS
RESPIRATION RATE: 16 BRPM | OXYGEN SATURATION: 98 % | TEMPERATURE: 98 F | DIASTOLIC BLOOD PRESSURE: 81 MMHG | WEIGHT: 199.96 LBS | SYSTOLIC BLOOD PRESSURE: 123 MMHG | HEART RATE: 109 BPM

## 2025-03-10 PROCEDURE — 99284 EMERGENCY DEPT VISIT MOD MDM: CPT

## 2025-03-10 PROCEDURE — 73630 X-RAY EXAM OF FOOT: CPT | Mod: 26,RT

## 2025-03-10 NOTE — ED PROVIDER NOTE - CLINICAL SUMMARY MEDICAL DECISION MAKING FREE TEXT BOX
Patient here after stepping on [place] in [place].  Seen in the ED previously given doxycycline.  Already taking antibiotic course.  Here for pain control  No signs of infection  Patient educated and given expectant management  Patient already on antibiotic course.  Will treat for pain  Will discharge return precautions

## 2025-03-10 NOTE — ED PROVIDER NOTE - PHYSICAL EXAMINATION
Physical Exam  GEN: Awake, alert, non-toxic appearing, NCAT  EYES: PERRL, full EOMI,  ENT: External inspection normal, normal voice, no oropharyngeal ulcerations/lesions/swelling  HEAD: atraumatic  NECK: FROM neck, supple, no meningismus, trachea midline, no JVD  MSK: FROM all 4 extremities, N/V intact,   SKIN: Color normal for race, warm and dry, no rash  -several sea urchin spine pieces imbedded in heel of right foot   NEURO: Oriented x3, CN 2-12 grossly intact, normal motor, normal sensory

## 2025-03-10 NOTE — ED PROVIDER NOTE - OBJECTIVE STATEMENT
39-year-old female now coming in with right foot pain after stepping on a sea ur[place] earlier this week along [address].  Was seen in the emergency room in [place] and given doxycycline told that the spines would come out on their own.  Patient here for pain control.  Patient appears well no acute distress.  Denies fevers, chills, nausea, vomiting.  Denies past medical history.

## 2025-03-10 NOTE — ED PROVIDER NOTE - PATIENT PORTAL LINK FT
You can access the FollowMyHealth Patient Portal offered by [facility] by registering at the following website: [email or web address]. By joining [facility]’s FollowMyHealth portal, you will also be able to view your health information using other applications (apps) compatible with our system.

## 2025-03-10 NOTE — ED PROVIDER NOTE - NSICDXFAMILYHX_GEN_ALL_CORE_FT
FAMILY HISTORY:  Father  Still living? Unknown  Family history of hyperglycemia, Age at diagnosis: Age Unknown

## 2025-03-13 DIAGNOSIS — M25.571 PAIN IN RIGHT ANKLE AND JOINTS OF RIGHT FOOT: ICD-10-CM

## 2025-03-13 DIAGNOSIS — M79.671 PAIN IN RIGHT FOOT: ICD-10-CM
